# Patient Record
Sex: MALE | Race: BLACK OR AFRICAN AMERICAN | URBAN - METROPOLITAN AREA
[De-identification: names, ages, dates, MRNs, and addresses within clinical notes are randomized per-mention and may not be internally consistent; named-entity substitution may affect disease eponyms.]

---

## 2018-03-17 ENCOUNTER — OFFICE VISIT (OUTPATIENT)
Dept: FAMILY MEDICINE | Facility: CLINIC | Age: 23
End: 2018-03-17
Payer: COMMERCIAL

## 2018-03-17 VITALS
HEART RATE: 84 BPM | WEIGHT: 190 LBS | OXYGEN SATURATION: 98 % | BODY MASS INDEX: 25.73 KG/M2 | TEMPERATURE: 98.4 F | HEIGHT: 72 IN

## 2018-03-17 DIAGNOSIS — R05.9 COUGH: Primary | ICD-10-CM

## 2018-03-17 PROCEDURE — 99213 OFFICE O/P EST LOW 20 MIN: CPT | Performed by: FAMILY MEDICINE

## 2018-03-17 RX ORDER — FLUTICASONE PROPIONATE 50 MCG
1 SPRAY, SUSPENSION (ML) NASAL DAILY
Qty: 1 BOTTLE | Refills: 1 | Status: SHIPPED | OUTPATIENT
Start: 2018-03-17

## 2018-03-17 RX ORDER — BENZONATATE 100 MG/1
100 CAPSULE ORAL 3 TIMES DAILY PRN
Qty: 30 CAPSULE | Refills: 0 | Status: SHIPPED | OUTPATIENT
Start: 2018-03-17 | End: 2019-06-05 | Stop reason: ALTCHOICE

## 2018-03-17 ASSESSMENT — ENCOUNTER SYMPTOMS
SINUS PAIN: 0
CHILLS: 0
SHORTNESS OF BREATH: 0
SINUS PRESSURE: 0
FATIGUE: 1
FEVER: 0
SORE THROAT: 0
COUGH: 1
RHINORRHEA: 1

## 2018-03-17 NOTE — PATIENT INSTRUCTIONS
The common cold is a viral infection of your upper respiratory tract -- your nose and throat. A common cold is usually harmless, although it may not feel that way at the time. If it's not a runny nose, sore throat and cough, it's the watery eyes, sneezing and congestion -- or maybe all of the above. In fact, because any one of more than 100 viruses can cause a common cold, signs and symptoms tend to vary greatly.    Cold viruses are almost always present in the environment. But the following factors can increase your chances of getting a cold:    Age. Infants and  children are especially susceptible to common colds because they haven't yet developed resistance to most of the viruses that cause them. But an immature immune system isn't the only thing that makes kids vulnerable. They also tend to spend lots of time with other children and frequently aren't careful about washing their hands and covering their mouths and noses when they cough and sneeze. Colds in newborns can be problematic if they interfere with nursing or breathing through the nose.  Immunity. As you age, you develop immunity to many of the viruses that cause common colds. You'll have colds less frequently than you did as a child. However, you can still come down with a cold when you are exposed to cold viruses or have a weakened immune system. All of these factors increase your risk of a cold.  Time of year. Both children and adults are more susceptible to colds in fall and winter. That's because children are in school and most people spend a lot of time indoors. In warmer climates where cold weather doesn't keep people inside, colds are more frequent in the rainy season.  You may not be able to cure your common cold, but you can make yourself as comfortable as possible. These tips may help:    Drink lots of fluids. Water, juice, clear broth or warm lemon water are all good choices. They help replace fluids lost during mucus production or  fever. Avoid alcohol and caffeine, which can cause dehydration, and cigarette smoke, which can aggravate your symptoms.  Try chicken soup. Generations of parents have spooned chicken soup into their sick children's mouths. Now scientists have put chicken soup to the test, discovering that it does seem to help relieve cold and flu symptoms in two ways. First, it acts as an anti-inflammatory by inhibiting the movement of neutrophils -- immune system cells that help the body's response to inflammation. Second, it temporarily speeds up the movement of mucus through the nose, helping relieve congestion and limiting the time viruses are in contact with the nasal lining.  Get some rest. If possible, stay home from work or school if you have a fever or a bad cough or are drowsy after taking medications. This will give you a chance to rest as well as reduce the chances that you'll infect others. Wear a mask when you have a cold if you live or work with someone with a chronic disease or compromised immune system.  Adjust your room's temperature and humidity. Keep your room warm, but not overheated. If the air is dry, a cool-mist humidifier or vaporizer can moisten the air and help ease congestion and coughing. Be sure to keep the humidifier clean to prevent the growth of bacteria and molds.  Soothe your throat. A saltwater gargle -- 1/4 to 1/2 teaspoon salt dissolved in an 8-ounce glass of warm water -- can temporarily relieve a sore or scratchy throat.  Use saline nasal drops. To help relieve nasal congestion, try saline nasal drops. You can buy these drops over-the-counter, and they're effective, safe and nonirritating, even for children. In infants, experts recommend instilling several saline drops into one nostril, then gently suctioning that nostril with a bulb syringe (insert the bulb syringe about 1/4 to 1/2 inch, or about 6 to 12 millimeters). Doing this before feeding your baby can improve your child's ability to  nurse or take a bottle, and before bedtime it may improve sleep. Saline nasal sprays may be used in older children.  No vaccine has been developed for the common cold, which can be caused by many different viruses. But you can take some common-sense precautions to slow the spread of cold viruses:    Wash your hands. Clean your hands thoroughly and often, and teach your children the importance of hand-washing.  Scrub your stuff. Keep kitchen and bathroom countertops clean, especially when someone in your family has a common cold. Wash children's toys periodically.  Use tissues. Always sneeze and cough into tissues. Discard used tissues right away, and then wash your hands carefully. Teach children to sneeze or cough into the bend of their elbow when they don't have a tissue. That way they cover their mouths without using their hands.  Don't share. Don't share drinking glasses or utensils with other family members. Use your own glass or disposable cups when you or someone else is sick. Label the cup or glass with the name of the person with the cold.  Steer clear of colds. Avoid close, prolonged contact with anyone who has a cold.  Choose your  center wisely. Look for a  setting with good hygiene practices and clear policies about keeping sick children at home.

## 2018-03-17 NOTE — PROGRESS NOTES
Subjective      Patient ID: Jani Carrion is a 22 y.o. male.    Cold sx x 1 week  Cough lingering - which is still ongoing  When it first started it was more headaches, sinus congestion  Morning coughs a lot with sputum  Cough throughout the day feeling like he has phlegm but can't get it all up  Last night had difficulty sleeping - had to wake up and take a mucinex dm    When had ha was taking ibuprofen/tylenol  Has tried some sudafed            The following have been reviewed and updated as appropriate in this visit:       Review of Systems   Constitutional: Positive for fatigue. Negative for chills and fever.   HENT: Positive for congestion, postnasal drip and rhinorrhea. Negative for ear pain, sinus pain, sinus pressure and sore throat.    Respiratory: Positive for cough. Negative for shortness of breath.    Cardiovascular: Negative for chest pain.       Objective     Vitals:    03/17/18 0952   Pulse: 84   Temp: 36.9 °C (98.4 °F)   SpO2: 98%   Weight: 86.2 kg (190 lb)   Height: 1.829 m (6')     Body mass index is 25.77 kg/m².    Physical Exam   Constitutional: He appears well-developed and well-nourished.   HENT:   Head: Normocephalic and atraumatic.   Right Ear: Tympanic membrane, external ear and ear canal normal.   Left Ear: Tympanic membrane, external ear and ear canal normal.   Nose: Rhinorrhea present. No sinus tenderness. Right sinus exhibits no maxillary sinus tenderness and no frontal sinus tenderness. Left sinus exhibits no maxillary sinus tenderness and no frontal sinus tenderness.   Mouth/Throat: Oropharynx is clear and moist.   +moderate inflammation of turbinates b/l   Eyes: Conjunctivae and EOM are normal. Pupils are equal, round, and reactive to light.   Neck: Normal range of motion. Neck supple. No thyromegaly present.   Cardiovascular: Normal rate, regular rhythm and normal heart sounds.    No murmur heard.  Pulmonary/Chest: Effort normal and breath sounds normal. No respiratory distress.  He has no wheezes.   Vitals reviewed.      Assessment/Plan   Problem List Items Addressed This Visit     None      Visit Diagnoses     Cough    -  Primary    most likely secondary to post URI/postnasal drip. increase water, cont mucinex, rx fluticasone and tessalon. rest.

## 2018-06-05 ENCOUNTER — OFFICE VISIT (OUTPATIENT)
Dept: FAMILY MEDICINE | Facility: CLINIC | Age: 23
End: 2018-06-05
Payer: COMMERCIAL

## 2018-06-05 VITALS
BODY MASS INDEX: 25.87 KG/M2 | HEART RATE: 54 BPM | TEMPERATURE: 98.3 F | SYSTOLIC BLOOD PRESSURE: 110 MMHG | WEIGHT: 191 LBS | HEIGHT: 72 IN | DIASTOLIC BLOOD PRESSURE: 68 MMHG | OXYGEN SATURATION: 98 %

## 2018-06-05 DIAGNOSIS — Z00.00 ENCOUNTER FOR GENERAL ADULT MEDICAL EXAMINATION WITHOUT ABNORMAL FINDINGS: Primary | ICD-10-CM

## 2018-06-05 DIAGNOSIS — L80 VITILIGO: ICD-10-CM

## 2018-06-05 PROCEDURE — 99395 PREV VISIT EST AGE 18-39: CPT | Performed by: FAMILY MEDICINE

## 2018-06-05 RX ORDER — TACROLIMUS 1 MG/G
OINTMENT TOPICAL 2 TIMES DAILY
COMMUNITY

## 2018-06-05 ASSESSMENT — ENCOUNTER SYMPTOMS
ABDOMINAL PAIN: 0
NAUSEA: 0
COUGH: 0
EYE PAIN: 0
CHILLS: 0
NUMBNESS: 0
DYSURIA: 0
ARTHRALGIAS: 0
CONSTIPATION: 0
FEVER: 0
SHORTNESS OF BREATH: 0
NERVOUS/ANXIOUS: 0
SORE THROAT: 0
PALPITATIONS: 0
EYE ITCHING: 0
VOMITING: 0
HEMATURIA: 0
WHEEZING: 0
DIARRHEA: 0
BACK PAIN: 0
SINUS PRESSURE: 0
VOICE CHANGE: 0
DIZZINESS: 0

## 2018-06-05 NOTE — PROGRESS NOTES
Subjective      Patient ID: Jani Carrion is a 22 y.o. male.      HPI  Patient presents today for physical exam.  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.     Social History     Social History   • Marital status: Single     Spouse name: N/A   • Number of children: N/A   • Years of education: N/A     Occupational History   • Not on file.     Social History Main Topics   • Smoking status: Never Smoker   • Smokeless tobacco: Never Used   • Alcohol use 1.8 oz/week     1 Glasses of wine, 1 Cans of beer, 1 Shots of liquor per week   • Drug use: No   • Sexual activity: Yes     Partners: Female      Comment: monogamous relationship 2 years      Other Topics Concern   • Not on file     Social History Narrative   • No narrative on file       Family History   Problem Relation Age of Onset   • Kidney disease Mother    • Lung cancer Father        History reviewed. No pertinent surgical history.    History reviewed. No pertinent past medical history.    Goals     None          The following have been reviewed and updated as appropriate in this visit:  Tobacco  Allergies  Meds  Problems  Med Hx  Surg Hx  Fam Hx  Soc Hx        Review of Systems   Constitutional: Negative for chills and fever.   HENT: Negative for ear pain, hearing loss, postnasal drip, sinus pressure, sore throat and voice change.    Eyes: Negative for pain, itching and visual disturbance.   Respiratory: Negative for cough, shortness of breath and wheezing.    Cardiovascular: Negative for chest pain and palpitations.   Gastrointestinal: Negative for abdominal pain, constipation, diarrhea, nausea and vomiting.   Genitourinary: Negative for dysuria and hematuria.   Musculoskeletal: Negative for arthralgias and back pain.   Skin: Negative for rash.   Neurological: Negative for dizziness and numbness.   Psychiatric/Behavioral: The patient is not nervous/anxious.        Objective     Vitals:    06/05/18 0925   BP: 110/68   Pulse: (!) 54   Temp: 36.8  °C (98.3 °F)   TempSrc: Oral   SpO2: 98%   Weight: 86.6 kg (191 lb)   Height: 1.829 m (6')     Body mass index is 25.9 kg/m².    Physical Exam   Constitutional: He is oriented to person, place, and time. He appears well-developed and well-nourished.   HENT:   Head: Normocephalic and atraumatic.   Right Ear: External ear normal.   Left Ear: External ear normal.   Nose: Nose normal.   Mouth/Throat: Oropharynx is clear and moist. No oropharyngeal exudate.   Eyes: Conjunctivae and EOM are normal. Pupils are equal, round, and reactive to light.   Neck: Normal range of motion. Neck supple. No thyromegaly present.   Cardiovascular: Normal rate, regular rhythm, normal heart sounds and intact distal pulses.  Exam reveals no gallop and no friction rub.    No murmur heard.  Pulmonary/Chest: Effort normal and breath sounds normal. No respiratory distress. He has no wheezes. He has no rales.   Abdominal: Soft. Bowel sounds are normal. There is no tenderness.   Musculoskeletal: He exhibits no edema or tenderness.   Lymphadenopathy:     He has no cervical adenopathy.   Neurological: He is alert and oriented to person, place, and time. No cranial nerve deficit.   Skin: Skin is warm and dry. No erythema.   Psychiatric: He has a normal mood and affect. His behavior is normal. Judgment and thought content normal.       Assessment/Plan   Problem List Items Addressed This Visit     Vitiligo     Managed by dr. ladonna Mayogra on tacrolimus ointment         Relevant Medications    tacrolimus (PROTOPIC) 0.1 % ointment    Other Relevant Orders    Ambulatory referral to Dermatology      Other Visit Diagnoses     Encounter for general adult medical examination without abnormal findings    -  Primary    counseled diet/nutrition and exercise

## 2019-02-06 ENCOUNTER — OFFICE VISIT (OUTPATIENT)
Dept: FAMILY MEDICINE | Facility: CLINIC | Age: 24
End: 2019-02-06
Payer: COMMERCIAL

## 2019-02-06 VITALS
WEIGHT: 197 LBS | HEART RATE: 67 BPM | OXYGEN SATURATION: 97 % | BODY MASS INDEX: 26.68 KG/M2 | TEMPERATURE: 97.6 F | HEIGHT: 72 IN

## 2019-02-06 DIAGNOSIS — Z11.3 SCREEN FOR STD (SEXUALLY TRANSMITTED DISEASE): ICD-10-CM

## 2019-02-06 DIAGNOSIS — M54.50 ACUTE MIDLINE LOW BACK PAIN WITHOUT SCIATICA: Primary | ICD-10-CM

## 2019-02-06 PROCEDURE — 99213 OFFICE O/P EST LOW 20 MIN: CPT | Performed by: FAMILY MEDICINE

## 2019-02-06 RX ORDER — NAPROXEN 500 MG/1
500 TABLET ORAL 2 TIMES DAILY WITH MEALS
Qty: 30 TABLET | Refills: 1 | Status: SHIPPED | OUTPATIENT
Start: 2019-02-06 | End: 2019-08-05

## 2019-02-06 ASSESSMENT — ENCOUNTER SYMPTOMS
DIZZINESS: 0
PALPITATIONS: 0
DYSURIA: 0
NAUSEA: 0
FEVER: 0
HEMATURIA: 0
VOMITING: 0
CONSTIPATION: 0
SHORTNESS OF BREATH: 0
WHEEZING: 0
JOINT SWELLING: 0
ABDOMINAL PAIN: 0
ARTHRALGIAS: 0
EYE ITCHING: 0
CHILLS: 0
DIARRHEA: 0
SORE THROAT: 0
NUMBNESS: 0
SINUS PRESSURE: 0
VOICE CHANGE: 0
BACK PAIN: 1
COUGH: 0
NERVOUS/ANXIOUS: 0
EYE PAIN: 0

## 2019-02-06 NOTE — PROGRESS NOTES
Subjective      Patient ID: Jani Carrion is a 23 y.o. male.    HPI     Back pain worse past 2 months  Has been sitting more with studying for boards  Has started lifting again (squats, deadlifts) - not super heavy, but not something he's been doing on a regular basis  Denies weakness, no radiation  Pain in lumbosacral area 4/10  Thinks it's just tight muscles    Pt needs hiv screening for [employer]    The following have been reviewed and updated as appropriate in this visit:       Review of Systems   Constitutional: Negative for chills and fever.   HENT: Negative for ear pain, hearing loss, postnasal drip, sinus pressure, sore throat and voice change.    Eyes: Negative for pain, itching and visual disturbance.   Respiratory: Negative for cough, shortness of breath and wheezing.    Cardiovascular: Negative for chest pain and palpitations.   Gastrointestinal: Negative for abdominal pain, constipation, diarrhea, nausea and vomiting.   Genitourinary: Negative for dysuria and hematuria.   Musculoskeletal: Positive for back pain. Negative for arthralgias, gait problem and joint swelling.   Skin: Negative for rash.   Neurological: Negative for dizziness and numbness.   Psychiatric/Behavioral: The patient is not nervous/anxious.        Social History     Social History   • Marital status: Single     Spouse name: N/A   • Number of children: N/A   • Years of education: N/A     Occupational History   • Not on file.     Social History Main Topics   • Smoking status: Never Smoker   • Smokeless tobacco: Never Used   • Alcohol use 1.8 oz/week     1 Glasses of wine, 1 Cans of beer, 1 Shots of liquor per week   • Drug use: No   • Sexual activity: Yes     Partners: Female      Comment: monogamous relationship 2 years      Other Topics Concern   • Not on file     Social History Narrative   • No narrative on file       Objective     Vitals:    02/06/19 1253   Pulse: 67   Temp: 36.4 °C (97.6 °F)   SpO2: 97%   Weight: 89.4 kg (197 lb)    Height: 1.829 m (6')     Body mass index is 26.72 kg/m².    Physical Exam   Constitutional: He is oriented to person, place, and time. He appears well-developed and well-nourished.   HENT:   Head: Normocephalic and atraumatic.   Eyes: EOM are normal.   Neck: No thyromegaly present.   Cardiovascular: Normal rate, regular rhythm, normal heart sounds and intact distal pulses.  Exam reveals no gallop and no friction rub.    No murmur heard.  Pulmonary/Chest: Effort normal and breath sounds normal. No respiratory distress. He has no wheezes. He has no rales.   Musculoskeletal: Normal range of motion. He exhibits tenderness (along paraspinals lumbosacral region). He exhibits no edema.   Neurological: He is alert and oriented to person, place, and time.   Skin: Skin is warm and dry.   Psychiatric: He has a normal mood and affect. His behavior is normal.       Assessment/Plan   Problem List Items Addressed This Visit     None      Visit Diagnoses     Acute midline low back pain without sciatica    -  Primary    nsaids, stretching, cont lifting just back off on heaviness of weights, if no improvement consider PT. pt to look into OMT as well through [facility]    Screen for STD (sexually transmitted disease)        Relevant Orders    HIV 1,2 AB P24 AG

## 2019-02-07 LAB — HIV 1+2 AB+HIV1 P24 AG SERPL QL IA: NON REACTIVE

## 2019-03-07 DIAGNOSIS — M25.511 ACUTE PAIN OF RIGHT SHOULDER: Primary | ICD-10-CM

## 2019-03-26 ENCOUNTER — OFFICE VISIT (OUTPATIENT)
Dept: FAMILY MEDICINE | Facility: CLINIC | Age: 24
End: 2019-03-26
Payer: COMMERCIAL

## 2019-03-26 VITALS
OXYGEN SATURATION: 96 % | HEART RATE: 69 BPM | HEIGHT: 72 IN | BODY MASS INDEX: 27.09 KG/M2 | TEMPERATURE: 99.2 F | WEIGHT: 200 LBS

## 2019-03-26 DIAGNOSIS — J02.0 PHARYNGITIS DUE TO STREPTOCOCCUS SPECIES: Primary | ICD-10-CM

## 2019-03-26 PROCEDURE — 99213 OFFICE O/P EST LOW 20 MIN: CPT | Performed by: FAMILY MEDICINE

## 2019-03-26 RX ORDER — AMOXICILLIN 500 MG/1
500 TABLET, FILM COATED ORAL 2 TIMES DAILY
Qty: 20 TABLET | Refills: 0 | Status: SHIPPED | OUTPATIENT
Start: 2019-03-26 | End: 2019-06-05 | Stop reason: ALTCHOICE

## 2019-03-26 ASSESSMENT — ENCOUNTER SYMPTOMS
MYALGIAS: 0
CHILLS: 0
ARTHRALGIAS: 0
EYE PAIN: 0
DIFFICULTY URINATING: 0
UNEXPECTED WEIGHT CHANGE: 0
EYE DISCHARGE: 0
LIGHT-HEADEDNESS: 0
ABDOMINAL PAIN: 0
NERVOUS/ANXIOUS: 0
SHORTNESS OF BREATH: 0
DIZZINESS: 0
SINUS PRESSURE: 0
RHINORRHEA: 0
FEVER: 0
CHEST TIGHTNESS: 0
APPETITE CHANGE: 0
TROUBLE SWALLOWING: 0
WOUND: 0
DYSPHORIC MOOD: 0
SINUS PAIN: 0
SORE THROAT: 1

## 2019-03-26 NOTE — PROGRESS NOTES
Subjective      Patient ID: Jani Carrion is a 23 y.o. male.    HPI    Sore throat x 4 days with t max 100.4, minimal cough, fiancee has URI   Able to move air without issue  Some dysphagia   Similar symptoms when he had mono two years prior       Patient Active Problem List    Diagnosis Date Noted   • Pharyngitis due to Streptococcus species 03/26/2019   • Vitiligo 06/05/2018              Review of Systems   Constitutional: Negative for appetite change, chills, fever and unexpected weight change.   HENT: Positive for sore throat. Negative for congestion, postnasal drip, rhinorrhea, sinus pain, sinus pressure and trouble swallowing.    Eyes: Negative for pain, discharge and visual disturbance.   Respiratory: Negative for chest tightness and shortness of breath.    Cardiovascular: Negative for chest pain and leg swelling.   Gastrointestinal: Negative for abdominal pain.   Genitourinary: Negative for difficulty urinating and urgency.   Musculoskeletal: Negative for arthralgias and myalgias.   Skin: Negative for rash and wound.   Neurological: Negative for dizziness and light-headedness.   Psychiatric/Behavioral: Negative for dysphoric mood. The patient is not nervous/anxious.        No Known Allergies     reports that he has never smoked. He has never used smokeless tobacco. He reports that he drinks about 1.8 oz of alcohol per week . He reports that he does not use drugs.    family history includes Kidney disease in his mother; Lung cancer in his father.    No past medical history on file.    No past surgical history on file.    Objective     Vitals:    03/26/19 1906   Pulse: 69   Temp: 37.3 °C (99.2 °F)   SpO2: 96%     Body mass index is 27.12 kg/m².        Current Outpatient Prescriptions:   •  amoxicillin (AMOXIL) 500 mg tablet, Take 1 tablet (500 mg total) by mouth 2 (two) times a day for 10 days., Disp: 20 tablet, Rfl: 0  •  fluticasone (FLONASE) 50 mcg/actuation nasal spray, Administer 1 spray into each  nostril daily., Disp: 1 Bottle, Rfl: 1  •  naproxen (NAPROSYN) 500 mg tablet, Take 1 tablet (500 mg total) by mouth 2 (two) times a day with meals., Disp: 30 tablet, Rfl: 1  •  tacrolimus (PROTOPIC) 0.1 % ointment, Apply topically 2 (two) times a day., Disp: , Rfl:     Physical Exam   Constitutional: He is oriented to person, place, and time. He appears well-developed and well-nourished. No distress.   HENT:   Head: Normocephalic and atraumatic.   Right Ear: Hearing and external ear normal.   Left Ear: Hearing and external ear normal.   Nose: Nose normal.   Mouth/Throat: Mucous membranes are normal. Posterior oropharyngeal erythema present. No oropharyngeal exudate.       Eyes: Conjunctivae and lids are normal. Lids are everted and swept, no foreign bodies found.   Neck: Trachea normal, normal range of motion and full passive range of motion without pain. Neck supple. No tracheal deviation present. No thyroid mass and no thyromegaly present.   Cardiovascular: Normal rate, regular rhythm, S1 normal, S2 normal, normal heart sounds, intact distal pulses and normal pulses.    Pulmonary/Chest: Effort normal and breath sounds normal. No respiratory distress. He has no decreased breath sounds. He has no wheezes. He has no rhonchi. He has no rales.   Neurological: He is alert and oriented to person, place, and time.   Skin: Skin is warm, dry and intact. No rash noted. He is not diaphoretic.   Psychiatric: He has a normal mood and affect. His behavior is normal. Judgment and thought content normal.   Nursing note and vitals reviewed.      No results found for: CREATININE  No results found for: WBC, HGB, HCT, MCV, PLT      No results found for: HGBA1C  No results found for: MICROALBUR, [id number]      Assessment/Plan   Diagnoses and all orders for this visit:    Pharyngitis due to Streptococcus species (Primary)  Assessment & Plan:  Will check throat culture   rx given for amox   If negative suspect viral etiology        Orders:  -     amoxicillin (AMOXIL) 500 mg tablet; Take 1 tablet (500 mg total) by mouth 2 (two) times a day for 10 days.  -     Throat culture, comprehensive; Future      The above listed chronic conditions remain stable, changes and medications refilled  are listed under each condition.  Appropriate labs ordered have been tagged to their respective condition for monitoring     Maggie Gooden DO  7:18 PM

## 2019-03-28 LAB
BACTERIA SPEC RESP CULT: ABNORMAL
BACTERIA SPEC RESP CULT: ABNORMAL

## 2019-06-05 ENCOUNTER — OFFICE VISIT (OUTPATIENT)
Dept: FAMILY MEDICINE | Facility: CLINIC | Age: 24
End: 2019-06-05
Payer: COMMERCIAL

## 2019-06-05 VITALS
WEIGHT: 200 LBS | SYSTOLIC BLOOD PRESSURE: 108 MMHG | BODY MASS INDEX: 27.09 KG/M2 | DIASTOLIC BLOOD PRESSURE: 80 MMHG | HEART RATE: 62 BPM | TEMPERATURE: 98.9 F | HEIGHT: 72 IN | OXYGEN SATURATION: 98 %

## 2019-06-05 DIAGNOSIS — L80 VITILIGO: ICD-10-CM

## 2019-06-05 DIAGNOSIS — J30.1 SEASONAL ALLERGIC RHINITIS DUE TO POLLEN: ICD-10-CM

## 2019-06-05 DIAGNOSIS — Z00.00 ROUTINE GENERAL MEDICAL EXAMINATION AT A HEALTH CARE FACILITY: Primary | ICD-10-CM

## 2019-06-05 PROBLEM — J02.0 PHARYNGITIS DUE TO STREPTOCOCCUS SPECIES: Status: RESOLVED | Noted: 2019-03-26 | Resolved: 2019-06-05

## 2019-06-05 PROCEDURE — 99395 PREV VISIT EST AGE 18-39: CPT | Performed by: FAMILY MEDICINE

## 2019-06-05 ASSESSMENT — ENCOUNTER SYMPTOMS
SHORTNESS OF BREATH: 0
COLOR CHANGE: 0
DIARRHEA: 0
SORE THROAT: 0
ABDOMINAL PAIN: 0
FEVER: 0
CONSTITUTIONAL NEGATIVE: 1
CHILLS: 0
CONSTIPATION: 0
COUGH: 0
LIGHT-HEADEDNESS: 0
JOINT SWELLING: 0
PALPITATIONS: 0
DIZZINESS: 0
BACK PAIN: 0

## 2019-06-05 NOTE — PROGRESS NOTES
[facility] Medicine     CHIEF COMPLAINT   Physical      HISTORY OF PRESENT ILLNESS      This is a 23 y.o. male who presents for physical. 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 Has boards this Saturday.    Patient follows with derm for vitiligo on Tacrolimus    Has hx of allergic rhinitis takes flonase.     Goes to gym 4 days a week. Patient follows a vegetarian diet. Eats beans, yogurt, eggs for protein.       PAST MEDICAL AND SURGICAL HISTORY      Past Medical History:   Diagnosis Date   • Vitiligo        Past Surgical History:   Procedure Laterality Date   • NO PAST SURGERIES         MEDICATIONS        Current Outpatient Prescriptions:   •  fluticasone (FLONASE) 50 mcg/actuation nasal spray, Administer 1 spray into each nostril daily., Disp: 1 Bottle, Rfl: 1  •  naproxen (NAPROSYN) 500 mg tablet, Take 1 tablet (500 mg total) by mouth 2 (two) times a day with meals., Disp: 30 tablet, Rfl: 1  •  tacrolimus (PROTOPIC) 0.1 % ointment, Apply topically 2 (two) times a day., Disp: , Rfl:     ALLERGIES      Patient has no known allergies.    FAMILY HISTORY      Family History   Problem Relation Age of Onset   • Kidney disease Mother    • Lung cancer Father        SOCIAL HISTORY      Social History     Social History   • Marital status: Single     Spouse name: N/A   • Number of children: N/A   • Years of education: N/A     Occupational History   • Student       Social History Main Topics   • Smoking status: Never Smoker   • Smokeless tobacco: Never Used   • Alcohol use 1.8 oz/week     1 Glasses of wine, 1 Cans of beer, 1 Shots of liquor per week      Comment: socially    • Drug use: No   • Sexual activity: Yes     Partners: Female     Birth control/ protection: Condom Male      Comment: monogamous relationship 2 years      Other Topics Concern   • None     Social History Narrative   • None       REVIEW OF SYSTEMS      .Review of Systems   Constitutional: Negative.  Negative for chills and fever.    HENT: Negative for congestion, hearing loss and sore throat.    Respiratory: Negative for cough and shortness of breath.    Cardiovascular: Negative for chest pain, palpitations and leg swelling.   Gastrointestinal: Negative for abdominal pain, constipation and diarrhea.   Musculoskeletal: Negative for back pain and joint swelling.   Skin: Negative for color change and rash.   Neurological: Negative for dizziness and light-headedness.       PHYSICAL EXAMINATION      /80   Pulse 62   Temp 37.2 °C (98.9 °F)   Ht 1.829 m (6')   Wt 90.7 kg (200 lb)   SpO2 98%   BMI 27.12 kg/m²   Body mass index is 27.12 kg/m².    Physical Exam   Constitutional: He is oriented to person, place, and time. He appears well-developed and well-nourished.   HENT:   Head: Normocephalic and atraumatic.   Eyes: Conjunctivae and EOM are normal.   Neck: Normal range of motion.   Cardiovascular: Normal rate and regular rhythm.    No murmur heard.  Pulmonary/Chest: Effort normal and breath sounds normal. No respiratory distress. He has no wheezes.   Abdominal: Soft. Bowel sounds are normal.   Neurological: He is alert and oriented to person, place, and time.   Skin: Skin is warm and dry.   B/l Periorbital and left arm hypopigmentation c/w vitiligo     Psychiatric: He has a normal mood and affect. His behavior is normal. Judgment and thought content normal.     PHQ 2 to 9:  Over the Past 2 Weeks, Have You Felt Down, Depressed or Hopeless?: no  Over the Past 2 Weeks, Have You Felt Little Interest or Pleasure In Doing Things?: no      LABS / IMAGING / STUDIES        Labs  No results found for: CREATININE  No results found for: WBC, HGB, HCT, MCV, PLT      No results found for: HGBA1C  No results found for: MICROALBUR, [id number]        Imaging  Not applicable                    ASSESSMENT AND PLAN   Problem List Items Addressed This Visit     Vitiligo     Follows with derm. On Tacrolimus ointment           Other Visit Diagnoses     Routine  general medical examination at a health care facility    -  Primary    Counseled on continued healthy eating/exercise. Baseline labs ordered. School form completed    Relevant Orders    CBC and differential    Comprehensive metabolic panel    Lipid panel    Seasonal allergic rhinitis due to pollen        Flonase tanvir Juares DO  06/05/19

## 2019-06-05 NOTE — PATIENT INSTRUCTIONS
[facility] [place] [facility]   [place], [place] [facility] restaurants ([employer], [employer], etc)

## 2020-06-15 ENCOUNTER — APPOINTMENT (RX ONLY)
Dept: URBAN - METROPOLITAN AREA CLINIC 28 | Facility: CLINIC | Age: 25
Setting detail: DERMATOLOGY
End: 2020-06-15

## 2020-06-15 DIAGNOSIS — L73.9 FOLLICULAR DISORDER, UNSPECIFIED: ICD-10-CM

## 2020-06-15 DIAGNOSIS — L80 VITILIGO: ICD-10-CM

## 2020-06-15 DIAGNOSIS — L738 OTHER SPECIFIED DISEASES OF HAIR AND HAIR FOLLICLES: ICD-10-CM

## 2020-06-15 DIAGNOSIS — L663 OTHER SPECIFIED DISEASES OF HAIR AND HAIR FOLLICLES: ICD-10-CM

## 2020-06-15 PROBLEM — L02.92 FURUNCLE, UNSPECIFIED: Status: ACTIVE | Noted: 2020-06-15

## 2020-06-15 PROCEDURE — 99213 OFFICE O/P EST LOW 20 MIN: CPT | Mod: 95

## 2020-06-15 PROCEDURE — ? ADDITIONAL NOTES

## 2020-06-15 PROCEDURE — ? PRESCRIPTION

## 2020-06-15 ASSESSMENT — LOCATION SIMPLE DESCRIPTION DERM
LOCATION SIMPLE: LEFT FOREARM
LOCATION SIMPLE: RIGHT FOREARM
LOCATION SIMPLE: CHIN
LOCATION SIMPLE: RIGHT UPPER ARM
LOCATION SIMPLE: LEFT UPPER ARM
LOCATION SIMPLE: RIGHT CHEEK
LOCATION SIMPLE: LEFT LIP
LOCATION SIMPLE: LEFT CHEEK

## 2020-06-15 ASSESSMENT — LOCATION ZONE DERM
LOCATION ZONE: LIP
LOCATION ZONE: ARM
LOCATION ZONE: FACE

## 2020-06-15 ASSESSMENT — SEVERITY VITILIGO: VITILIGO SEVERITY: 4

## 2020-06-15 ASSESSMENT — LOCATION DETAILED DESCRIPTION DERM
LOCATION DETAILED: RIGHT INFERIOR MEDIAL BUCCAL CHEEK
LOCATION DETAILED: LEFT LOWER CUTANEOUS LIP
LOCATION DETAILED: LEFT MEDIAL MALAR CHEEK
LOCATION DETAILED: RIGHT MEDIAL MALAR CHEEK
LOCATION DETAILED: RIGHT VENTRAL PROXIMAL FOREARM
LOCATION DETAILED: RIGHT CHIN
LOCATION DETAILED: RIGHT ANTERIOR DISTAL UPPER ARM
LOCATION DETAILED: LEFT VENTRAL PROXIMAL FOREARM
LOCATION DETAILED: LEFT ANTERIOR DISTAL UPPER ARM

## 2020-06-15 ASSESSMENT — SEVERITY ASSESSMENT: SEVERITY: MILD TO MODERATE

## 2020-06-15 ASSESSMENT — BSA RASH: BSA RASH: 5

## 2020-06-16 RX ORDER — TACROLIMUS 1 MG/G
OINTMENT TOPICAL BID
Qty: 1 | Refills: 3 | Status: ACTIVE

## 2020-06-16 RX ORDER — HYDROCORTISONE 25 MG/G
CREAM TOPICAL
Qty: 1 | Refills: 0 | Status: ACTIVE

## 2020-06-16 RX ORDER — TRIAMCINOLONE ACETONIDE 1 MG/G
CREAM TOPICAL BID
Qty: 1 | Refills: 3 | Status: ACTIVE

## 2020-06-16 RX ORDER — TACROLIMUS 0.3 MG/G
OINTMENT TOPICAL
Qty: 1 | Refills: 0 | Status: ACTIVE

## 2020-06-29 ENCOUNTER — TELEPHONE (OUTPATIENT)
Dept: FAMILY MEDICINE | Facility: CLINIC | Age: 25
End: 2020-06-29

## 2020-06-29 NOTE — TELEPHONE ENCOUNTER
Place on schedule for PE sometime in July when schedule is avail need for school. Can not do it in August.

## 2020-07-23 ENCOUNTER — OFFICE VISIT (OUTPATIENT)
Dept: FAMILY MEDICINE | Facility: CLINIC | Age: 25
End: 2020-07-23
Payer: COMMERCIAL

## 2020-07-23 VITALS
HEIGHT: 72 IN | OXYGEN SATURATION: 97 % | DIASTOLIC BLOOD PRESSURE: 82 MMHG | TEMPERATURE: 97.3 F | HEART RATE: 66 BPM | BODY MASS INDEX: 25.71 KG/M2 | WEIGHT: 189.8 LBS | SYSTOLIC BLOOD PRESSURE: 122 MMHG

## 2020-07-23 DIAGNOSIS — F33.0 MILD EPISODE OF RECURRENT MAJOR DEPRESSIVE DISORDER (CMS/HCC): ICD-10-CM

## 2020-07-23 DIAGNOSIS — L64.9 MALE PATTERN BALDNESS: ICD-10-CM

## 2020-07-23 DIAGNOSIS — R09.82 PND (POST-NASAL DRIP): ICD-10-CM

## 2020-07-23 DIAGNOSIS — Z00.00 ROUTINE GENERAL MEDICAL EXAMINATION AT A HEALTH CARE FACILITY: Primary | ICD-10-CM

## 2020-07-23 PROCEDURE — 99395 PREV VISIT EST AGE 18-39: CPT | Mod: GC | Performed by: STUDENT IN AN ORGANIZED HEALTH CARE EDUCATION/TRAINING PROGRAM

## 2020-07-23 ASSESSMENT — ENCOUNTER SYMPTOMS
EYE PAIN: 0
NAUSEA: 0
FEVER: 0
WEAKNESS: 0
FREQUENCY: 0
COUGH: 0
DIARRHEA: 0
SORE THROAT: 0
WHEEZING: 0
PALPITATIONS: 0
AGITATION: 0
SHORTNESS OF BREATH: 0
UNEXPECTED WEIGHT CHANGE: 0
DYSURIA: 0
DIZZINESS: 0
CONSTIPATION: 0
RHINORRHEA: 0
NUMBNESS: 0
VOMITING: 0
NECK PAIN: 0
NECK STIFFNESS: 0
CHILLS: 0
FATIGUE: 0

## 2020-07-23 NOTE — ASSESSMENT & PLAN NOTE
Harsha, denies SI/HI.  Currently undergoing talk therapy and he feels like this is helping.  Does not want to pursue medication at this time, but states he will let office know if he is interested in starting any.  Continue counseling in interim.

## 2020-07-23 NOTE — PROGRESS NOTES
[facility]     CHIEF COMPLAINT   Annual Physical Exam     HISTORY OF PRESENT ILLNESS      This is a 24 y.o. male who presents for an annual physical exam.  .  He additionally denies any changes to his health since his last visit.  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  He is undergoing talk therapy through his school and he states this is helpful, he does not want any pharmaceutical intervention at this time.    He also admits to some hair thinning compatible with male-pattern baldness.  He denies any major history of premature balding in any family members.  Denies hair loss anywhere else.      He is a devout vegetarian, but supplements his intake with a daily multivitamin.  He states he likes to exercise, however is having a hard time doing so due to time constraints of current rotations.    He wears a seatbelt 100% while in a car.  He has smoke/CO detectors in the home.    PAST MEDICAL AND SURGICAL HISTORY      Past Medical History:   Diagnosis Date   • Vitiligo        Past Surgical History:   Procedure Laterality Date   • NO PAST SURGERIES     • SKIN BIOPSY  8/2017    Vitiligo       MEDICATIONS        Current Outpatient Medications:   •  fluticasone (FLONASE) 50 mcg/actuation nasal spray, Administer 1 spray into each nostril daily., Disp: 1 Bottle, Rfl: 1  •  naproxen (NAPROSYN) 500 mg tablet, Take 1 tablet (500 mg total) by mouth 2 (two) times a day with meals., Disp: 30 tablet, Rfl: 1  •  tacrolimus (PROTOPIC) 0.1 % ointment, Apply topically 2 (two) times a day., Disp: , Rfl:     ALLERGIES      Patient has no known allergies.    FAMILY HISTORY      Family History   Problem Relation Age of Onset   • Kidney disease Biological Mother    • Asthma Biological Mother    • Depression Biological Mother    • Hypertension Biological Mother    • Lung cancer  Biological Father    • Diabetes Biological Father    • Hyperlipidemia Biological Father    • Hypertension Biological Father    • Asthma Biological Sister        SOCIAL HISTORY      Social History     Socioeconomic History   • Marital status: Single     Spouse name: Not on file   • Number of children: Not on file   • Years of education: Not on file   • Highest education level: Not on file   Occupational History   • Occupation: Student    Social Needs   • Financial resource strain: Not on file   • Food insecurity:     Worry: Not on file     Inability: Not on file   • Transportation needs:     Medical: Not on file     Non-medical: Not on file   Tobacco Use   • Smoking status: Never Smoker   • Smokeless tobacco: Never Used   Substance and Sexual Activity   • Alcohol use: Yes     Alcohol/week: 6.0 standard drinks     Types: 6 Cans of beer per week     Comment: socially    • Drug use: No   • Sexual activity: Yes     Partners: Female     Birth control/protection: Coitus interruptus/Pulling Out     Comment: In long-term relationship   Lifestyle   • Physical activity:     Days per week: Not on file     Minutes per session: Not on file   • Stress: Not on file   Relationships   • Social connections:     Talks on phone: Not on file     Gets together: Not on file     Attends [employer]ism service: Not on file     Active member of club or organization: Not on file     Attends meetings of clubs or organizations: Not on file     Relationship status: Not on file   • Intimate partner violence:     Fear of current or ex partner: Not on file     Emotionally abused: Not on file     Physically abused: Not on file     Forced sexual activity: Not on file   Other Topics Concern   • Not on file   Social History Narrative   • Not on file       REVIEW OF SYSTEMS      Review of Systems   Constitutional: Negative for chills, fatigue, fever and unexpected weight change.   HENT: Negative for congestion, ear pain, hearing loss, rhinorrhea and sore  throat.    Eyes: Negative for pain and visual disturbance.   Respiratory: Negative for cough, shortness of breath and wheezing.    Cardiovascular: Negative for chest pain, palpitations and leg swelling.   Gastrointestinal: Negative for constipation, diarrhea, nausea and vomiting.   Genitourinary: Negative for dysuria and frequency.   Musculoskeletal: Negative for neck pain and neck stiffness.   Skin: Negative for rash.   Neurological: Negative for dizziness, weakness and numbness.   Psychiatric/Behavioral: Negative for agitation and behavioral problems.       PHYSICAL EXAMINATION      Visit Vitals  /82   Pulse 66   Temp 36.3 °C (97.3 °F)   Ht 1.829 m (6')   Wt 86.1 kg (189 lb 12.8 oz)   SpO2 97%   BMI 25.74 kg/m²     Body mass index is 25.74 kg/m².    Physical Exam   Constitutional: He is oriented to person, place, and time. He appears well-developed and well-nourished. No distress.   HENT:   Head: Normocephalic and atraumatic.   Eyes: Pupils are equal, round, and reactive to light.   Neck: Normal range of motion. Neck supple.   Cardiovascular: Normal rate, regular rhythm and normal heart sounds. Exam reveals no gallop and no friction rub.   No murmur heard.  Pulmonary/Chest: Effort normal and breath sounds normal. No respiratory distress. He has no wheezes. He has no rales.   Abdominal: Soft. Bowel sounds are normal. He exhibits no distension. There is no tenderness. There is no rebound and no guarding.   Musculoskeletal: Normal range of motion. He exhibits no edema or deformity.   Neurological: He is alert and oriented to person, place, and time.   Skin: Skin is warm and dry. No rash noted. He is not diaphoretic.   Psychiatric: He has a normal mood and affect.       LABS / IMAGING / STUDIES        Labs    No results found for: CREATININE  No results found for: WBC, HGB, HCT, MCV, PLT      No results found for: HGBA1C  No results found for: MICROALBUR, [id number]        HEALTH MAINTENANCE           HPV:  UTD  Tdap:UTD  Flu: UTD  Dentist: UTD  Last PE: 1yr  Last Labs: not in records, overdue      The ASCVD Risk score (Maye MAGANA Jr., et al., 2013) failed to calculate for the following reasons:    The 2013 ASCVD risk score is only valid for ages 40 to 79       ASSESSMENT AND PLAN   Problem List Items Addressed This Visit        Endocrine/Metabolic    Male pattern baldness     DDx includes idiopathic premature balding vs. Thyroid vs. Low testosterone.  Denies any sxs of hypo/hyperthyroidism.  Denies changes to energy or libido and has no, so low testosterone less likely.      Counseled re next steps of workup and treatment options.  Pt would like to defer this at this time.            Other    PND (post-nasal drip)    Mild episode of recurrent major depressive disorder (CMS/HCC)     Stale, denies SI/HI.  Currently undergoing talk therapy and he feels like this is helping.  Does not want to pursue medication at this time, but states he will let office know if he is interested in starting any.  Continue counseling in interim.         Routine general medical examination at a health care facility - Primary     Pt is overdue for baseline labs as he did not have them drawn last year, renewed scripts.  Otherwise UTD w/ preventive care.  RTO 1yr or sooner PRN.         Relevant Orders    Lipid panel    CBC    Comprehensive metabolic panel          Health Care Maintenance:    Patient encouraged to increase activity gradually as tolerated with a goal of 150 minutes of aerobic activity per week with 2 sessions of weight training weekly.     Counseled patient on healthy eating (high quality, low carb, low sugar diet).     Advised patient to complete regular dental examinations, brushing and flossing daily.    Encouraged enhanced safety by wearing seat belts, checking smoke and CO detectors.            Jhonathan Mckeon, DO  07/23/20

## 2020-07-23 NOTE — ASSESSMENT & PLAN NOTE
Pt is overdue for baseline labs as he did not have them drawn last year, renewed scripts.  Otherwise UTD w/ preventive care.  RTO 1yr or sooner PRN.

## 2020-07-23 NOTE — ASSESSMENT & PLAN NOTE
DDx includes idiopathic premature balding vs. Thyroid vs. Low testosterone.  Denies any sxs of hypo/hyperthyroidism.  Denies changes to energy or libido and has no, so low testosterone less likely.      Counseled re next steps of workup and treatment options.  Pt would like to defer this at this time.

## 2020-08-04 LAB
ALBUMIN SERPL-MCNC: 5.1 G/DL (ref 4.1–5.2)
ALBUMIN/GLOB SERPL: 2.4 {RATIO} (ref 1.2–2.2)
ALP SERPL-CCNC: 63 IU/L (ref 39–117)
ALT SERPL-CCNC: 26 IU/L (ref 0–44)
AST SERPL-CCNC: 20 IU/L (ref 0–40)
BILIRUB SERPL-MCNC: 0.5 MG/DL (ref 0–1.2)
BUN SERPL-MCNC: 13 MG/DL (ref 6–20)
BUN/CREAT SERPL: 13 (ref 9–20)
CALCIUM SERPL-MCNC: 9.7 MG/DL (ref 8.7–10.2)
CHLORIDE SERPL-SCNC: 101 MMOL/L (ref 96–106)
CHOLEST SERPL-MCNC: 129 MG/DL (ref 100–199)
CO2 SERPL-SCNC: 28 MMOL/L (ref 20–29)
CREAT SERPL-MCNC: 1.01 MG/DL (ref 0.76–1.27)
ERYTHROCYTE [DISTWIDTH] IN BLOOD BY AUTOMATED COUNT: 12.6 % (ref 11.6–15.4)
GLOBULIN SER CALC-MCNC: 2.1 G/DL (ref 1.5–4.5)
GLUCOSE SERPL-MCNC: 90 MG/DL (ref 65–99)
HCT VFR BLD AUTO: 43.2 % (ref 37.5–51)
HDLC SERPL-MCNC: 64 MG/DL
HGB BLD-MCNC: 14.3 G/DL (ref 13–17.7)
LAB CORP EGFR IF AFRICN AM: 118 ML/MIN/1.73
LAB CORP EGFR IF NONAFRICN AM: 102 ML/MIN/1.73
LDLC SERPL CALC-MCNC: 53 MG/DL (ref 0–99)
MCH RBC QN AUTO: 28.4 PG (ref 26.6–33)
MCHC RBC AUTO-ENTMCNC: 33.1 G/DL (ref 31.5–35.7)
MCV RBC AUTO: 86 FL (ref 79–97)
PLATELET # BLD AUTO: 216 X10E3/UL (ref 150–450)
POTASSIUM SERPL-SCNC: 4.1 MMOL/L (ref 3.5–5.2)
PROT SERPL-MCNC: 7.2 G/DL (ref 6–8.5)
RBC # BLD AUTO: 5.04 X10E6/UL (ref 4.14–5.8)
SODIUM SERPL-SCNC: 140 MMOL/L (ref 134–144)
TRIGL SERPL-MCNC: 58 MG/DL (ref 0–149)
VLDLC SERPL CALC-MCNC: 12 MG/DL (ref 5–40)
WBC # BLD AUTO: 5 X10E3/UL (ref 3.4–10.8)

## 2020-11-09 DIAGNOSIS — Z11.1 SCREENING FOR TUBERCULOSIS: Primary | ICD-10-CM

## 2020-11-13 LAB
GAMMA INTERFERON BACKGROUND BLD IA-ACNC: 0.03 IU/ML
LAB CORP QUANTIFERON INCUBATION: NORMAL
LAB CORP QUANTIFERON TB2 AG: 0.04 IU/ML
M TB IFN-G BLD-IMP: NEGATIVE
M TB IFN-G CD4+ BCKGRND COR BLD-ACNC: 0.04 IU/ML
MITOGEN IGNF BLD-ACNC: >10 IU/ML
SERVICE CMNT-IMP: NORMAL

## 2021-02-16 DIAGNOSIS — Z11.3 SCREEN FOR STD (SEXUALLY TRANSMITTED DISEASE): Primary | ICD-10-CM

## 2021-02-23 LAB — HIV 1+2 AB+HIV1 P24 AG SERPL QL IA: NON REACTIVE
